# Patient Record
Sex: MALE | Employment: FULL TIME | URBAN - METROPOLITAN AREA
[De-identification: names, ages, dates, MRNs, and addresses within clinical notes are randomized per-mention and may not be internally consistent; named-entity substitution may affect disease eponyms.]

---

## 2021-05-06 ENCOUNTER — OFFICE VISIT CONVERTED (OUTPATIENT)
Dept: NEUROSURGERY | Facility: CLINIC | Age: 36
End: 2021-05-06
Attending: PHYSICIAN ASSISTANT
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
## 2021-07-13 ENCOUNTER — OFFICE VISIT (OUTPATIENT)
Dept: NEUROSURGERY | Facility: CLINIC | Age: 36
End: 2021-07-13

## 2021-07-13 VITALS
WEIGHT: 213.2 LBS | HEIGHT: 71 IN | BODY MASS INDEX: 29.85 KG/M2 | DIASTOLIC BLOOD PRESSURE: 82 MMHG | SYSTOLIC BLOOD PRESSURE: 126 MMHG

## 2021-07-13 DIAGNOSIS — G56.21 ULNAR NEUROPATHY AT ELBOW OF RIGHT UPPER EXTREMITY: Primary | ICD-10-CM

## 2021-07-13 PROCEDURE — 99212 OFFICE O/P EST SF 10 MIN: CPT | Performed by: NEUROLOGICAL SURGERY

## 2021-07-13 RX ORDER — MELOXICAM 15 MG/1
15 TABLET ORAL DAILY
COMMUNITY

## 2021-07-13 NOTE — PROGRESS NOTES
Tashi Soria is a 36 y.o. male that presents with Arm Pain       Between the brace and NSAIDs, the symptoms have improved notably. He doesn't feel that surgery would be helpful.       Review of Systems   Musculoskeletal: Positive for myalgias and neck stiffness.   Neurological: Positive for numbness.        Vitals:    07/13/21 1356   BP: 126/82        Physical Exam  Musculoskeletal:      Comments: -Tinel's    Neurological:      General: No focal deficit present.      Mental Status: He is alert.      Sensory: No sensory deficit.      Deep Tendon Reflexes: Reflexes normal.   Psychiatric:         Mood and Affect: Mood normal.             Assessment and Plan {CC Problem List  Visit Diagnosis  ROS  Review (Popup)  Health Maintenance  Quality  BestPractice  Medications  SmartSets  SnapShot Encounters  Media :23}   Problem List Items Addressed This Visit     None      Visit Diagnoses     Ulnar neuropathy at elbow of right upper extremity    -  Primary      As he has improved, he would like to avoid surgery for now. He will monitor for return of numbness or weakness of the hand.     Follow Up {Instructions Charge Capture  Follow-up Communications :23}   No follow-ups on file.

## 2021-07-15 VITALS
HEART RATE: 69 BPM | WEIGHT: 214 LBS | SYSTOLIC BLOOD PRESSURE: 133 MMHG | HEIGHT: 71 IN | BODY MASS INDEX: 29.96 KG/M2 | DIASTOLIC BLOOD PRESSURE: 85 MMHG